# Patient Record
Sex: MALE | Race: WHITE | Employment: FULL TIME | URBAN - METROPOLITAN AREA
[De-identification: names, ages, dates, MRNs, and addresses within clinical notes are randomized per-mention and may not be internally consistent; named-entity substitution may affect disease eponyms.]

---

## 2019-01-21 ENCOUNTER — ANCILLARY PROCEDURE (OUTPATIENT)
Dept: GENERAL RADIOLOGY | Facility: CLINIC | Age: 44
End: 2019-01-21
Payer: COMMERCIAL

## 2019-01-21 ENCOUNTER — OFFICE VISIT (OUTPATIENT)
Dept: URGENT CARE | Facility: URGENT CARE | Age: 44
End: 2019-01-21
Payer: COMMERCIAL

## 2019-01-21 VITALS
TEMPERATURE: 99.4 F | HEART RATE: 88 BPM | DIASTOLIC BLOOD PRESSURE: 84 MMHG | OXYGEN SATURATION: 98 % | SYSTOLIC BLOOD PRESSURE: 129 MMHG

## 2019-01-21 DIAGNOSIS — S46.912A LEFT SHOULDER STRAIN, INITIAL ENCOUNTER: ICD-10-CM

## 2019-01-21 DIAGNOSIS — S46.912A LEFT SHOULDER STRAIN, INITIAL ENCOUNTER: Primary | ICD-10-CM

## 2019-01-21 PROCEDURE — 99213 OFFICE O/P EST LOW 20 MIN: CPT | Performed by: FAMILY MEDICINE

## 2019-01-21 PROCEDURE — 73030 X-RAY EXAM OF SHOULDER: CPT | Mod: LT

## 2019-01-21 NOTE — LETTER
[facility]  [address]  [facility] [address]  [phone]      January 21, 2019    RE:  Martinez Granda                                                                                                                                                       [address]  [facility] [address]            To whom it may concern:    Martinez Granda is under my professional care for Left shoulder strain, initial encounter.   He  may return to work with the following: Light duty-unable to lift more than 20 pounds  And no repeatative movement of the left arm for 3 weeks  .          Sincerely,        Priti Padilla MD    [facility]

## 2019-01-24 ENCOUNTER — OFFICE VISIT (OUTPATIENT)
Dept: FAMILY MEDICINE | Facility: CLINIC | Age: 44
End: 2019-01-24
Payer: COMMERCIAL

## 2019-01-24 VITALS
BODY MASS INDEX: 21.86 KG/M2 | WEIGHT: 136 LBS | SYSTOLIC BLOOD PRESSURE: 133 MMHG | TEMPERATURE: 98.2 F | HEIGHT: 66 IN | RESPIRATION RATE: 16 BRPM | DIASTOLIC BLOOD PRESSURE: 83 MMHG | HEART RATE: 90 BPM

## 2019-01-24 DIAGNOSIS — S46.912D STRAIN OF LEFT SHOULDER, SUBSEQUENT ENCOUNTER: Primary | ICD-10-CM

## 2019-01-24 PROCEDURE — 99213 OFFICE O/P EST LOW 20 MIN: CPT | Performed by: FAMILY MEDICINE

## 2019-01-24 ASSESSMENT — MIFFLIN-ST. JEOR: SCORE: 1441.7

## 2019-01-24 NOTE — PATIENT INSTRUCTIONS
Patient Education     Muscle Strain in the Extremities  A muscle strain is a stretching and tearing of muscle fibers. This causes pain, especially when you move that muscle. There may also be some swelling and bruising.  Home care    Keep the hurt area raised above heart level to reduce pain and swelling. This is especially important during the first 48 hours.    Apply an ice pack over the injured area for 15 to 20 minutes every 3 to 6 hours. You should do this for the first 24 to 48 hours. You can make an ice pack by filling a plastic bag that seals at the top with ice cubes and then wrapping it with a thin towel. Be careful not to injure your skin with the ice treatments. Ice should never be applied directly to skin. Continue the use of ice packs for relief of pain and swelling as needed. After 48 hours, apply heat (warm shower or warm bath) for 15 to 20 minutes several times a day, or alternate ice and heat.    You may use over-the-counter pain medicine to control pain, unless another medicine was prescribed. If you have chronic liver or kidney disease or ever had a stomach ulcer or gastrointestinal bleeding, talk with your healthcare provider before using these medicines.    For leg strains: If crutches have been recommended, don t put full weight on the hurt leg until you can do so without pain. You can return to sports when you are able to hop and run on the injured leg without pain.  Follow-up care  Follow up with your healthcare provider, or as advised.  When to seek medical advice  Call your healthcare provider right away if any of these occur:    The toes of the injured leg become swollen, cold, blue, numb, or tingly    Pain or swelling increases   Date Last Reviewed: 5/1/2018 2000-2018 [employer]. [address] [place] [address]. All rights reserved. This information is not intended as a substitute for professional medical care. Always follow your healthcare professional's  instructions.

## 2019-01-30 NOTE — PROGRESS NOTES
[facility]: Physical Therapy Initial Evaluation   Jan 31, 2019  Subjective:   Chief Complaint: left shoulder pain   Pain: Usually at the top of the left shoulder, but sometimes extends into the back and up into the neck. Sometimes can also go down to the elbow (may be a separate issue).    Numbness/Tingling: none   Weakness: none   Stiffness: in the morning, mostly just in the shoulder  New/Recurrent/Chronic: New  DOI/onset: 2 months ago   Referral Date: 1/24/2019 - Ele Kingston MD  Mechanism of onset: came on slowly, had a fall on the ice last week which made it worse  PMH/surgical history/trauma: No significant Medical histroy reported  General health as reported by patient: Good    Medications: None  Occupation:  with [employer] Job duties: prolonged sitting, prolonged standing, lifting/carrying, repetitive tasks, driving, pushing/pulling,   Previous Treatment (Effect): None  Imaging: X-Ray IMPRESSION: Glenohumeral and acromioclavicular articulations are intact. No acute fracture.  AM/PM: best in the late morning, get more sore from there. Morning stiffness. Evening pain  Quality of Pain: ache  Pain: 5/10 at present, 1/10 at best, 8/10 at worst  Worse: quick movements are painful, generally gets worse throughout the day.   Better: ice, icy hot, not laying on that side, hot showers  Progression of Symptoms since onset: worse due to a fall   Hx of Falls: just one about a week ago, slipped on ice  Sleeping: No disturbance   Current Functional Status: reaching - pain with reaching with the arm, specifically reaching behind him ; driving - painful to drive, more of a sharp pain ; dressing - pain with getting jackets/sweatshirt on or off  Previous Functional Status: no restrictions prior  Current HEP/exercise regimen: karate, work  Hand/Leg Dominance: right-handed  Transportation to Therapy: Independent with transportation  Live with Others: wife, daughter (15 years old),  has quite a few pets (does not need to lift them)  Red Flags:   - Patient denies the following: Pain with Cough / Sneeze / Laughing ; Night Pain ; Fever ; Weakness ; Numbness/Tingling ; Chest Pain ; Unexplained Weight Loss ;     Patient's Goal(s): Make it not hurt anymore    Objective:    Standing Posture: mild FHP    Cervical Screen: Pain at the base of the neck with left cervical rotation    Shoulder: (* indicates patient's pain)   AROM R AROM L MMT R MMT L   Flex/  Elevation WNL WNL 5 5*   Abd WNL WNL 5 5   IR   5 5   ER 81 61 4+ 4**   IR/Ext T10 T12     Middle Trap   4+ 4*   Lower Trap   4- 3+*     Special tests:   R L   Impingement     Neer's  (-)   Hawkin's-Rex  (-)   Rotator Cuff Tear     ER Lag  Inconclusive     GH/Capsular Mobility  R L   Posterior glide     Inferior glide     Anterior glide             Assessment/Plan:    Patient is a 43 year old male with left side shoulder complaints.    Patient has the following significant findings with corresponding treatment plan.                Referring Diagnosis: Strain of left shoulder, subsequent encounter   PT Diagnosis: Left Shoulder pain with scapular instability  Pain -  hot/cold therapy, manual therapy, splint/taping/bracing/orthotics, self management, education and home program  Decreased ROM/flexibility - manual therapy, therapeutic exercise, therapeutic activity and home program  Decreased strength - therapeutic exercise, therapeutic activities and home program  Decreased function - therapeutic activities and home program  Impaired posture - neuro re-education, therapeutic activities and home program  Instability -  Therapeutic Activity  Therapeutic Exercise  Neuromuscular Re-education  Splinting/Taping/Bracing/Orthotic  home program          Therapy Evaluation Codes:   1) History comprised of:   Personal factors that impact the plan of care:      Profession.    Comorbidity factors that impact the plan of care are:      None.     Medications  impacting care: None.  2) Examination of Body Systems comprised of:   Body structures and functions that impact the plan of care:      Shoulder and Thoracic Spine.   Activity limitations that impact the plan of care are:      Driving, Dressing and Reaching.  3) Clinical presentation characteristics are:   Stable/Uncomplicated.  4) Decision-Making    Low complexity using standardized patient assessment instrument and/or measureable assessment of functional outcome.  Cumulative Therapy Evaluation is: Low complexity.    Previous and current functional limitations:  (See Goal Flow Sheet for this information)    Short term and Long term goals: (See Goal Flow Sheet for this information)     Communication ability:  Patient appears to be able to clearly communicate and understand verbal and written communication and follow directions correctly.  Treatment Explanation - The following has been discussed with the patient:   RX ordered/plan of care  Anticipated outcomes  Possible risks and side effects  This patient would benefit from PT intervention to resume normal activities.   Rehab potential is good.     Frequency:  1 X week, once daily  Duration:  for 4 weeks tapering to 2 X a month over 6 weeks  Discharge Plan:  Achieve all LTG.  Independent in home treatment program.  Reach maximal therapeutic benefit.    Please refer to the daily flowsheet for treatment today, total treatment time and time spent performing 1:1 timed codes.

## 2019-01-31 ENCOUNTER — THERAPY VISIT (OUTPATIENT)
Dept: PHYSICAL THERAPY | Facility: CLINIC | Age: 44
End: 2019-01-31
Payer: COMMERCIAL

## 2019-01-31 DIAGNOSIS — S46.912D STRAIN OF LEFT SHOULDER, SUBSEQUENT ENCOUNTER: ICD-10-CM

## 2019-01-31 DIAGNOSIS — M25.512 LEFT SHOULDER PAIN: ICD-10-CM

## 2019-01-31 PROCEDURE — 97112 NEUROMUSCULAR REEDUCATION: CPT | Mod: GP | Performed by: PHYSICAL THERAPIST

## 2019-01-31 PROCEDURE — 97161 PT EVAL LOW COMPLEX 20 MIN: CPT | Mod: GP | Performed by: PHYSICAL THERAPIST

## 2019-02-04 PROBLEM — M25.512 LEFT SHOULDER PAIN: Status: ACTIVE | Noted: 2019-02-04

## 2019-02-08 ENCOUNTER — THERAPY VISIT (OUTPATIENT)
Dept: PHYSICAL THERAPY | Facility: CLINIC | Age: 44
End: 2019-02-08
Payer: COMMERCIAL

## 2019-02-08 DIAGNOSIS — M25.512 LEFT SHOULDER PAIN: ICD-10-CM

## 2019-02-08 PROCEDURE — 97140 MANUAL THERAPY 1/> REGIONS: CPT | Mod: GP | Performed by: PHYSICAL THERAPIST

## 2019-02-08 PROCEDURE — 97110 THERAPEUTIC EXERCISES: CPT | Mod: GP | Performed by: PHYSICAL THERAPIST

## 2019-02-08 PROCEDURE — 97112 NEUROMUSCULAR REEDUCATION: CPT | Mod: GP | Performed by: PHYSICAL THERAPIST

## 2019-02-14 ENCOUNTER — THERAPY VISIT (OUTPATIENT)
Dept: PHYSICAL THERAPY | Facility: CLINIC | Age: 44
End: 2019-02-14
Payer: COMMERCIAL

## 2019-02-14 DIAGNOSIS — M25.512 LEFT SHOULDER PAIN: ICD-10-CM

## 2019-02-14 PROCEDURE — 97110 THERAPEUTIC EXERCISES: CPT | Mod: GP | Performed by: PHYSICAL THERAPIST

## 2019-02-14 PROCEDURE — 97530 THERAPEUTIC ACTIVITIES: CPT | Mod: GP | Performed by: PHYSICAL THERAPIST

## 2019-02-14 PROCEDURE — 97140 MANUAL THERAPY 1/> REGIONS: CPT | Mod: GP | Performed by: PHYSICAL THERAPIST

## 2019-02-20 ENCOUNTER — THERAPY VISIT (OUTPATIENT)
Dept: PHYSICAL THERAPY | Facility: CLINIC | Age: 44
End: 2019-02-20
Payer: COMMERCIAL

## 2019-02-20 DIAGNOSIS — M25.512 LEFT SHOULDER PAIN: ICD-10-CM

## 2019-02-20 PROCEDURE — 97110 THERAPEUTIC EXERCISES: CPT | Mod: GP | Performed by: PHYSICAL THERAPIST

## 2019-02-20 PROCEDURE — 97112 NEUROMUSCULAR REEDUCATION: CPT | Mod: GP | Performed by: PHYSICAL THERAPIST

## 2019-02-27 ENCOUNTER — THERAPY VISIT (OUTPATIENT)
Dept: PHYSICAL THERAPY | Facility: CLINIC | Age: 44
End: 2019-02-27
Payer: COMMERCIAL

## 2019-02-27 DIAGNOSIS — M25.512 LEFT SHOULDER PAIN: ICD-10-CM

## 2019-02-27 PROCEDURE — 97110 THERAPEUTIC EXERCISES: CPT | Mod: GP | Performed by: PHYSICAL THERAPIST

## 2019-02-27 PROCEDURE — 97112 NEUROMUSCULAR REEDUCATION: CPT | Mod: GP | Performed by: PHYSICAL THERAPIST

## 2019-03-07 ENCOUNTER — OFFICE VISIT (OUTPATIENT)
Dept: FAMILY MEDICINE | Facility: CLINIC | Age: 44
End: 2019-03-07
Payer: COMMERCIAL

## 2019-03-07 VITALS
RESPIRATION RATE: 16 BRPM | SYSTOLIC BLOOD PRESSURE: 136 MMHG | BODY MASS INDEX: 21.86 KG/M2 | TEMPERATURE: 98.3 F | DIASTOLIC BLOOD PRESSURE: 88 MMHG | HEART RATE: 104 BPM | HEIGHT: 66 IN | WEIGHT: 136 LBS

## 2019-03-07 DIAGNOSIS — M25.512 LEFT SHOULDER PAIN, UNSPECIFIED CHRONICITY: Primary | ICD-10-CM

## 2019-03-07 PROCEDURE — 99214 OFFICE O/P EST MOD 30 MIN: CPT | Performed by: FAMILY MEDICINE

## 2019-03-07 ASSESSMENT — MIFFLIN-ST. JEOR: SCORE: 1441.7

## 2019-03-07 NOTE — PROGRESS NOTES
"  SUBJECTIVE:   Martinez Granda is a 43 year old male who presents to clinic today for the following health issues:      Joint Pain    Onset:     Description:   Location: left shoulder  Character: Sharp    Intensity: moderate    Progression of Symptoms: same    Accompanying Signs & Symptoms:  Other symptoms: radiation of pain to to left elbow     History:   Previous similar pain: YES      Precipitating factors:   Trauma or overuse: YES    Alleviating factors:  Improved by: immobilization and physical therapy    Therapies Tried and outcome:     Per patient his pain has been on/off for the last year or so but worsened with fall in January.   Denies any numbness or tingling.   Admits has been keeping up with PT exercises - notes it is helping some but not the \"ball joint of the shoulder\".   Pain currently 3/10 and at its worse is 6-7/10 usually in the mornings on first waking up.           Problem list and histories reviewed & adjusted, as indicated.  Additional history: as documented    Patient Active Problem List   Diagnosis     CARDIOVASCULAR SCREENING; LDL GOAL LESS THAN 160     Chewing tobacco nicotine dependence with nicotine-induced disorder     Dental caries     Left shoulder pain     History reviewed. No pertinent surgical history.    Social History     Tobacco Use     Smoking status: Current Every Day Smoker     Smokeless tobacco: Never Used   Substance Use Topics     Alcohol use: Yes     Alcohol/week: 0.0 oz     History reviewed. No pertinent family history.        Reviewed and updated as needed this visit by clinical staff  Tobacco  Allergies  Meds  Med Hx  Surg Hx  Fam Hx  Soc Hx      Reviewed and updated as needed this visit by Provider         ROS:  Constitutional, msk  systems are negative, except as otherwise noted.    OBJECTIVE:     /88 (BP Location: Right arm, Patient Position: Chair, Cuff Size: Adult Large)   Pulse 104   Temp 98.3  F (36.8  C) (Oral)   Resp 16   Ht 1.664 m (5' 5.5\")   " Wt 61.7 kg (136 lb)   BMI 22.29 kg/m    Body mass index is 22.29 kg/m .  GENERAL: healthy, alert and no distress  MS: left shoulder- negative Neer/Kawking, mild discomfort with abduction   NEuro: sensation intact  Diagnostic Test Results:  none     ASSESSMENT/PLAN:       1. Left shoulder pain, unspecified chronicity  - considering Martinez is not improving will evaluate with MRI for possible tear.   - MR Shoulder Left w/o Contrast; Future    See Patient Instructions    Ele Kingston MD  [facility]

## 2019-03-12 ENCOUNTER — HOSPITAL ENCOUNTER (OUTPATIENT)
Dept: MRI IMAGING | Facility: CLINIC | Age: 44
Discharge: HOME | End: 2019-03-12
Attending: FAMILY MEDICINE | Admitting: FAMILY MEDICINE
Payer: COMMERCIAL

## 2019-03-12 DIAGNOSIS — M25.512 LEFT SHOULDER PAIN, UNSPECIFIED CHRONICITY: ICD-10-CM

## 2019-03-12 PROCEDURE — 73221 MRI JOINT UPR EXTREM W/O DYE: CPT | Mod: LT

## 2019-03-13 ENCOUNTER — THERAPY VISIT (OUTPATIENT)
Dept: PHYSICAL THERAPY | Facility: CLINIC | Age: 44
End: 2019-03-13
Payer: COMMERCIAL

## 2019-03-13 DIAGNOSIS — M25.512 LEFT SHOULDER PAIN, UNSPECIFIED CHRONICITY: Primary | ICD-10-CM

## 2019-03-13 DIAGNOSIS — M25.512 LEFT SHOULDER PAIN, UNSPECIFIED CHRONICITY: ICD-10-CM

## 2019-03-13 PROCEDURE — 97110 THERAPEUTIC EXERCISES: CPT | Mod: GP | Performed by: PHYSICAL THERAPIST

## 2019-03-13 PROCEDURE — 97112 NEUROMUSCULAR REEDUCATION: CPT | Mod: GP | Performed by: PHYSICAL THERAPIST

## 2019-03-21 ENCOUNTER — OFFICE VISIT (OUTPATIENT)
Dept: ORTHOPEDICS | Facility: CLINIC | Age: 44
End: 2019-03-21
Payer: COMMERCIAL

## 2019-03-21 VITALS
DIASTOLIC BLOOD PRESSURE: 86 MMHG | HEIGHT: 66 IN | BODY MASS INDEX: 21.86 KG/M2 | SYSTOLIC BLOOD PRESSURE: 148 MMHG | WEIGHT: 136 LBS

## 2019-03-21 DIAGNOSIS — M75.52 SUBACROMIAL BURSITIS OF LEFT SHOULDER JOINT: ICD-10-CM

## 2019-03-21 DIAGNOSIS — M75.112 INCOMPLETE TEAR OF LEFT ROTATOR CUFF: Primary | ICD-10-CM

## 2019-03-21 PROCEDURE — 99243 OFF/OP CNSLTJ NEW/EST LOW 30: CPT | Performed by: FAMILY MEDICINE

## 2019-03-21 ASSESSMENT — MIFFLIN-ST. JEOR: SCORE: 1441.7

## 2019-03-21 NOTE — LETTER
3/21/2019         RE: Martinez Granda  [address]  [facility] [address]        Dear Colleague,    Thank you for referring your patient, Martinez Granda, to the [facility]. Please see a copy of my visit note below.    ASSESSMENT & PLAN    1. Incomplete tear of left rotator cuff    2. Subacromial bursitis of left shoulder joint      Presents for consultation for chronic left shoulder pain  Has been participating in structured physical therapy with some improvement  Currently working without any restrictions  Discussed MRI - small full thickness tear with associated bursitis.  Good cuff strength on exam  Continue with physical therapy  If unable to progress with activity / work can consider surgical referral versus PRP injection  No work restrictions necessary    Follow-up if you don't continue to improve    -----    SUBJECTIVE  Martinez Granda is a/an 43 year old Right handed male who is seen in consultation at the request of  Ele Kingston M.D. for evaluation of left shoulder pain. The patient is seen by themselves.    Onset: intermittent for 1 years ago. Reports insidious onset without acute precipitating event. Thinks that it could be related to repetitive movements  Location of Pain: left diffuse shoulder pain with radiation into the upper arm  Rating of Pain at worst: 7/10  Rating of Pain Currently: 1/10  Worsened by: most sore when he wakes up, reaching behind into the back seat, sudden movement  Better with: Advil  Treatments tried: physical therapy (6 visits)  Associated symptoms:  feels like a tendon/rubber band is snapping, occasional radiation of pain into the left forearm  Orthopedic history: NO  Relevant surgical history: NO  Patient Social History: works as a      Patient's past medical, surgical, social, and family histories were reviewed today and no changes are noted.    REVIEW OF SYSTEMS:  10 point ROS is negative other than symptoms  "noted above in HPI, Past Medical History or as stated below  Constitutional: NEGATIVE for fever, chills, change in weight  Skin: NEGATIVE for worrisome rashes, moles or lesions  GI/: NEGATIVE for bowel or bladder changes  Neuro: NEGATIVE for weakness, dizziness or paresthesias    OBJECTIVE:  /86   Ht 1.664 m (5' 5.5\")   Wt 61.7 kg (136 lb)   BMI 22.29 kg/m      General: healthy, alert and in no distress  HEENT: no scleral icterus or conjunctival erythema  Skin: no suspicious lesions or rash. No jaundice.  CV: regular rhythm by palpation  Resp: normal respiratory effort without conversational dyspnea   Psych: normal mood and affect  Gait: normal steady gait with appropriate coordination and balance  Neuro: normal light touch sensory exam of the bilateral upper extremities.    MSK:  LEFT SHOULDER  Inspection:    no atrophy  Palpation:    Tender about the supraspinatus insertion. Remainder of bony and tendinous landmarks are nontender.  Active Range of Motion:     Abduction 1800, FF 1800, , IR normal.      Scapular dyskinesis present  Strength:    Scapular plane abduction 5/5, painful,  ER 5/5, IR 5/5, biceps 5/5  Special Tests:    Positive: Jimenez'    Negative: drop arm/painful arc, crossed arm adduction, Kellerton's and Speed's    Independent visualization of the below image:    Recent Results (from the past 744 hour(s))   MR Shoulder Left w/o Contrast    Narrative    MR SHOULDER LEFT WITHOUT CONTRAST  3/12/2019 4:10 PM    HISTORY:  Shoulder pain, acute, persistent, x-ray and exam  nonspecific. Left shoulder pain, unspecified chronicity.    TECHNIQUE:  Coronal oblique T1, T2, and fat suppressed T2, sagittal  oblique T2, and transverse proton density and T2 weighted images.    FINDINGS:   Osseous acromial outlet: There is a Type I subacromial configuration.  No apparent subacromial spur. The acromioclavicular joint appears  within normal limits with no indentation upon the " supraspinatus  outlet.    Rotator cuff:  Supraspinatus tendon thickening and intrasubstance  signal intensity suggests prominent tendinosis. Additionally, there is  a small high-grade, likely full-thickness tear at the anterolateral  margin of the tendon measuring approximately 1 cm in AP diameter with  minimal tendon retraction. No rotator cuff muscle atrophy.    Labral Structures: The glenoid labrum appears within normal limits. No  apparent SLAP lesion. No paralabral cysts.    Biceps Tendon: No long head biceps tendon tear, subluxation, or  tendinosis.    Osseous structures:  There is mild marrow edema, likely reactive along  the anatomical neck of the humerus adjacent to the supraspinatus  tendon attachment. Marrow signal about the shoulder is otherwise  unremarkable.    Joint space: There is a small glenohumeral joint effusion with fluid  primarily tracking into the subscapularis recess.    Additional findings:  Mild fluid is noted in the  subacromial/subdeltoid bursa.       Impression    IMPRESSION:   1. Supraspinatus tendon small high-grade, likely full-thickness tear  at the anterolateral margin. This is superimposed on prominent  tendinosis with tendon thickening and intrasubstance intermediate  signal intensity in the remainder of the supraspinatus tendon.  2. Associated mild glenohumeral joint effusion with mild fluid in the  subacromial/subdeltoid bursa as well.    DEBORA PEREZ MD       Patient's conditions were thoroughly discussed during today's visit with greater than 50% of the visit spent counseling the patient with total time spent face-to-face with the patient being 25 minutes.    Delfino Fraga DO [facility]      Again, thank you for allowing me to participate in the care of your patient.        Sincerely,        Delfino Fraga DO

## 2019-03-21 NOTE — PROGRESS NOTES
ASSESSMENT & PLAN    1. Incomplete tear of left rotator cuff    2. Subacromial bursitis of left shoulder joint      Presents for consultation for chronic left shoulder pain  Has been participating in structured physical therapy with some improvement  Currently working without any restrictions  Discussed MRI - small full thickness tear with associated bursitis.  Good cuff strength on exam  Continue with physical therapy  If unable to progress with activity / work can consider surgical referral versus PRP injection  No work restrictions necessary    Follow-up if you don't continue to improve    -----    SUBJECTIVE  Martinez Granda is a/an 43 year old Right handed male who is seen in consultation at the request of  Ele Kingston M.D. for evaluation of left shoulder pain. The patient is seen by themselves.    Onset: intermittent for 1 years ago. Reports insidious onset without acute precipitating event. Thinks that it could be related to repetitive movements  Location of Pain: left diffuse shoulder pain with radiation into the upper arm  Rating of Pain at worst: 7/10  Rating of Pain Currently: 1/10  Worsened by: most sore when he wakes up, reaching behind into the back seat, sudden movement  Better with: Advil  Treatments tried: physical therapy (6 visits)  Associated symptoms:  feels like a tendon/rubber band is snapping, occasional radiation of pain into the left forearm  Orthopedic history: NO  Relevant surgical history: NO  Patient Social History: works as a      Patient's past medical, surgical, social, and family histories were reviewed today and no changes are noted.    REVIEW OF SYSTEMS:  10 point ROS is negative other than symptoms noted above in HPI, Past Medical History or as stated below  Constitutional: NEGATIVE for fever, chills, change in weight  Skin: NEGATIVE for worrisome rashes, moles or lesions  GI/: NEGATIVE for bowel or bladder changes  Neuro: NEGATIVE for weakness,  "dizziness or paresthesias    OBJECTIVE:  /86   Ht 1.664 m (5' 5.5\")   Wt 61.7 kg (136 lb)   BMI 22.29 kg/m     General: healthy, alert and in no distress  HEENT: no scleral icterus or conjunctival erythema  Skin: no suspicious lesions or rash. No jaundice.  CV: regular rhythm by palpation  Resp: normal respiratory effort without conversational dyspnea   Psych: normal mood and affect  Gait: normal steady gait with appropriate coordination and balance  Neuro: normal light touch sensory exam of the bilateral upper extremities.    MSK:  LEFT SHOULDER  Inspection:    no atrophy  Palpation:    Tender about the supraspinatus insertion. Remainder of bony and tendinous landmarks are nontender.  Active Range of Motion:     Abduction 1800, FF 1800, , IR normal.      Scapular dyskinesis present  Strength:    Scapular plane abduction 5/5, painful,  ER 5/5, IR 5/5, biceps 5/5  Special Tests:    Positive: Jimenez'    Negative: drop arm/painful arc, crossed arm adduction, Banner's and Speed's    Independent visualization of the below image:    Recent Results (from the past 744 hour(s))   MR Shoulder Left w/o Contrast    Narrative    MR SHOULDER LEFT WITHOUT CONTRAST  3/12/2019 4:10 PM    HISTORY:  Shoulder pain, acute, persistent, x-ray and exam  nonspecific. Left shoulder pain, unspecified chronicity.    TECHNIQUE:  Coronal oblique T1, T2, and fat suppressed T2, sagittal  oblique T2, and transverse proton density and T2 weighted images.    FINDINGS:   Osseous acromial outlet: There is a Type I subacromial configuration.  No apparent subacromial spur. The acromioclavicular joint appears  within normal limits with no indentation upon the supraspinatus  outlet.    Rotator cuff:  Supraspinatus tendon thickening and intrasubstance  signal intensity suggests prominent tendinosis. Additionally, there is  a small high-grade, likely full-thickness tear at the anterolateral  margin of the tendon measuring approximately 1 cm " in AP diameter with  minimal tendon retraction. No rotator cuff muscle atrophy.    Labral Structures: The glenoid labrum appears within normal limits. No  apparent SLAP lesion. No paralabral cysts.    Biceps Tendon: No long head biceps tendon tear, subluxation, or  tendinosis.    Osseous structures:  There is mild marrow edema, likely reactive along  the anatomical neck of the humerus adjacent to the supraspinatus  tendon attachment. Marrow signal about the shoulder is otherwise  unremarkable.    Joint space: There is a small glenohumeral joint effusion with fluid  primarily tracking into the subscapularis recess.    Additional findings:  Mild fluid is noted in the  subacromial/subdeltoid bursa.       Impression    IMPRESSION:   1. Supraspinatus tendon small high-grade, likely full-thickness tear  at the anterolateral margin. This is superimposed on prominent  tendinosis with tendon thickening and intrasubstance intermediate  signal intensity in the remainder of the supraspinatus tendon.  2. Associated mild glenohumeral joint effusion with mild fluid in the  subacromial/subdeltoid bursa as well.    DEBORA PEREZ MD       Patient's conditions were thoroughly discussed during today's visit with greater than 50% of the visit spent counseling the patient with total time spent face-to-face with the patient being 25 minutes.    Delfino Fraga,  [facility]

## 2019-03-21 NOTE — PATIENT INSTRUCTIONS
1. Incomplete tear of left rotator cuff    2. Subacromial bursitis of left shoulder joint      Discussed MRI - small full thickness tear with associated bursitis  Continue with physical therapy  If unable to progress with activity / work can consider surgical referral     Follow-up if you don't continue to improve

## 2019-03-27 ENCOUNTER — THERAPY VISIT (OUTPATIENT)
Dept: PHYSICAL THERAPY | Facility: CLINIC | Age: 44
End: 2019-03-27
Payer: COMMERCIAL

## 2019-03-27 DIAGNOSIS — M25.512 LEFT SHOULDER PAIN, UNSPECIFIED CHRONICITY: ICD-10-CM

## 2019-03-27 PROCEDURE — 97112 NEUROMUSCULAR REEDUCATION: CPT | Mod: GP | Performed by: PHYSICAL THERAPIST

## 2019-03-27 PROCEDURE — 97110 THERAPEUTIC EXERCISES: CPT | Mod: GP | Performed by: PHYSICAL THERAPIST

## 2020-02-03 ENCOUNTER — OFFICE VISIT (OUTPATIENT)
Dept: URGENT CARE | Facility: URGENT CARE | Age: 45
End: 2020-02-03
Payer: COMMERCIAL

## 2020-02-03 ENCOUNTER — ANCILLARY PROCEDURE (OUTPATIENT)
Dept: GENERAL RADIOLOGY | Facility: CLINIC | Age: 45
End: 2020-02-03
Attending: FAMILY MEDICINE
Payer: COMMERCIAL

## 2020-02-03 VITALS
SYSTOLIC BLOOD PRESSURE: 126 MMHG | TEMPERATURE: 97.8 F | OXYGEN SATURATION: 98 % | RESPIRATION RATE: 16 BRPM | DIASTOLIC BLOOD PRESSURE: 86 MMHG | HEART RATE: 87 BPM

## 2020-02-03 DIAGNOSIS — R07.81 RIB PAIN ON LEFT SIDE: ICD-10-CM

## 2020-02-03 DIAGNOSIS — R07.81 RIB PAIN ON LEFT SIDE: Primary | ICD-10-CM

## 2020-02-03 PROCEDURE — 71101 X-RAY EXAM UNILAT RIBS/CHEST: CPT | Mod: LT

## 2020-02-03 PROCEDURE — 99213 OFFICE O/P EST LOW 20 MIN: CPT | Performed by: FAMILY MEDICINE

## 2020-02-03 NOTE — PROGRESS NOTES
SUBJECTIVE:  Martinez Granda is a 44 year old male who presents to the clinic today with a chief complaint of s/p fall. Fell on Saturday on the left side. Fell face down with left elbow beneath him. Now feels some left sided rib pain.    No past medical history on file.    No current outpatient medications on file.       Social History     Tobacco Use     Smoking status: Current Every Day Smoker     Smokeless tobacco: Never Used   Substance Use Topics     Alcohol use: Yes     Alcohol/week: 0.0 standard drinks       ROS   ROS: 10 point ROS neg other than the symptoms noted above in the HPI.      OBJECTIVE:  /86 (BP Location: Right arm, Patient Position: Sitting, Cuff Size: Adult Regular)   Pulse 87   Temp 97.8  F (36.6  C) (Tympanic)   Resp 16   SpO2 98%   GENERAL APPEARANCE: healthy, alert and no distress  EYES: EOMI,  PERRL, conjunctiva clear  HENT: ear canals and TM's normal.  Nose and mouth without ulcers, erythema or lesions  NECK: supple, nontender, no lymphadenopathy  RESP: lungs clear to auscultation - no rales, rhonchi or wheezes. TTP on anterior floating ribs of left side  CV: regular rates and rhythm, normal S1 S2, no murmur noted  ABDOMEN:  soft, nontender, no HSM or masses and bowel sounds normal  NEURO: Normal strength and tone, sensory exam grossly normal,  normal speech and mentation  SKIN: no suspicious lesions or rashes    ASSESSMENT: Negative for fractures of ribs. Bruised ribs. Ice and ibuprofen.,    PLAN:  See orders in Epic